# Patient Record
Sex: FEMALE | Race: WHITE | NOT HISPANIC OR LATINO | Employment: OTHER | URBAN - METROPOLITAN AREA
[De-identification: names, ages, dates, MRNs, and addresses within clinical notes are randomized per-mention and may not be internally consistent; named-entity substitution may affect disease eponyms.]

---

## 2024-03-21 ENCOUNTER — HOSPITAL ENCOUNTER (OUTPATIENT)
Dept: CARDIOLOGY | Facility: HOSPITAL | Age: 39
Discharge: HOME | End: 2024-03-21
Payer: COMMERCIAL

## 2024-03-21 VITALS
WEIGHT: 136 LBS | HEIGHT: 66 IN | SYSTOLIC BLOOD PRESSURE: 122 MMHG | BODY MASS INDEX: 21.86 KG/M2 | DIASTOLIC BLOOD PRESSURE: 81 MMHG

## 2024-03-21 DIAGNOSIS — Z98.890 STATUS POST MITRAL VALVE REPAIR: ICD-10-CM

## 2024-03-21 LAB
BH CV ECHO MEAS - AO MAX PG: 3.8 MMHG
BH CV ECHO MEAS - AO MEAN PG: 2 MMHG
BH CV ECHO MEAS - AO ROOT AREA (BSA CORRECTED): 2.1 CM2
BH CV ECHO MEAS - AO ROOT DIAM: 3.6 CM
BH CV ECHO MEAS - AO V2 MAX: 97.1 CM/SEC
BH CV ECHO MEAS - AO V2 VTI: 18.3 CM
BH CV ECHO MEAS - AVA(I,D): 3.5 CM2
BH CV ECHO MEAS - EDV(CUBED): 64 ML
BH CV ECHO MEAS - EDV(MOD-SP2): 106 ML
BH CV ECHO MEAS - EDV(MOD-SP4): 105 ML
BH CV ECHO MEAS - EF(MOD-BP): 57.9 %
BH CV ECHO MEAS - EF(MOD-SP2): 64.3 %
BH CV ECHO MEAS - EF(MOD-SP4): 52.1 %
BH CV ECHO MEAS - ESV(CUBED): 21.2 ML
BH CV ECHO MEAS - ESV(MOD-SP2): 37.8 ML
BH CV ECHO MEAS - ESV(MOD-SP4): 50.3 ML
BH CV ECHO MEAS - FS: 30.8 %
BH CV ECHO MEAS - IVS/LVPW: 0.88 CM
BH CV ECHO MEAS - IVSD: 0.7 CM
BH CV ECHO MEAS - LA DIMENSION: 2.2 CM
BH CV ECHO MEAS - LAT PEAK E' VEL: 12.7 CM/SEC
BH CV ECHO MEAS - LV DIASTOLIC VOL/BSA (35-75): 61.8 CM2
BH CV ECHO MEAS - LV MASS(C)D: 85.8 GRAMS
BH CV ECHO MEAS - LV MAX PG: 3.3 MMHG
BH CV ECHO MEAS - LV MEAN PG: 2 MMHG
BH CV ECHO MEAS - LV SYSTOLIC VOL/BSA (12-30): 29.6 CM2
BH CV ECHO MEAS - LV V1 MAX: 91.3 CM/SEC
BH CV ECHO MEAS - LV V1 VTI: 18.5 CM
BH CV ECHO MEAS - LVIDD: 4 CM
BH CV ECHO MEAS - LVIDS: 2.8 CM
BH CV ECHO MEAS - LVOT AREA: 3.5 CM2
BH CV ECHO MEAS - LVOT DIAM: 2.1 CM
BH CV ECHO MEAS - LVPWD: 0.8 CM
BH CV ECHO MEAS - MED PEAK E' VEL: 6.3 CM/SEC
BH CV ECHO MEAS - MR MAX PG: 110.2 MMHG
BH CV ECHO MEAS - MR MAX VEL: 524.8 CM/SEC
BH CV ECHO MEAS - MR MEAN PG: 76.6 MMHG
BH CV ECHO MEAS - MR MEAN VEL: 418.4 CM/SEC
BH CV ECHO MEAS - MR VTI: 167.9 CM
BH CV ECHO MEAS - MV A MAX VEL: 115.7 CM/SEC
BH CV ECHO MEAS - MV DEC SLOPE: 679.1 CM/SEC2
BH CV ECHO MEAS - MV DEC TIME: 0.25 SEC
BH CV ECHO MEAS - MV E MAX VEL: 115 CM/SEC
BH CV ECHO MEAS - MV E/A: 0.99
BH CV ECHO MEAS - MV MAX PG: 7.7 MMHG
BH CV ECHO MEAS - MV MEAN PG: 4.7 MMHG
BH CV ECHO MEAS - MV P1/2T: 56.7 MSEC
BH CV ECHO MEAS - MV V2 VTI: 35 CM
BH CV ECHO MEAS - MVA(P1/2T): 3.9 CM2
BH CV ECHO MEAS - MVA(VTI): 1.83 CM2
BH CV ECHO MEAS - PA ACC TIME: 0.12 SEC
BH CV ECHO MEAS - SI(MOD-SP2): 40.2 ML/M2
BH CV ECHO MEAS - SI(MOD-SP4): 32.2 ML/M2
BH CV ECHO MEAS - SV(LVOT): 63.9 ML
BH CV ECHO MEAS - SV(MOD-SP2): 68.2 ML
BH CV ECHO MEAS - SV(MOD-SP4): 54.7 ML
BH CV ECHO MEAS - TAPSE (>1.6): 1.63 CM
BH CV ECHO MEASUREMENTS AVERAGE E/E' RATIO: 12.11
BH CV VAS BP LEFT ARM: NORMAL MMHG
BH CV XLRA - RV BASE: 2.7 CM
BH CV XLRA - TDI S': 8.9 CM/SEC
LEFT ATRIUM VOLUME INDEX: 25.2 ML/M2

## 2024-03-21 PROCEDURE — 93306 TTE W/DOPPLER COMPLETE: CPT

## 2024-07-12 ENCOUNTER — OFFICE VISIT (OUTPATIENT)
Dept: OBSTETRICS AND GYNECOLOGY | Facility: CLINIC | Age: 39
End: 2024-07-12
Payer: COMMERCIAL

## 2024-07-12 VITALS
BODY MASS INDEX: 20.25 KG/M2 | HEIGHT: 66 IN | SYSTOLIC BLOOD PRESSURE: 118 MMHG | WEIGHT: 126 LBS | DIASTOLIC BLOOD PRESSURE: 80 MMHG

## 2024-07-12 DIAGNOSIS — Z30.011 ENCOUNTER FOR INITIAL PRESCRIPTION OF CONTRACEPTIVE PILLS: ICD-10-CM

## 2024-07-12 DIAGNOSIS — Z01.419 WOMEN'S ANNUAL ROUTINE GYNECOLOGICAL EXAMINATION: Primary | ICD-10-CM

## 2024-07-12 PROCEDURE — 99395 PREV VISIT EST AGE 18-39: CPT | Performed by: OBSTETRICS & GYNECOLOGY

## 2024-07-12 RX ORDER — DROSPIRENONE AND ETHINYL ESTRADIOL 0.02-3(28)
1 KIT ORAL DAILY
Qty: 84 TABLET | Refills: 4 | Status: SHIPPED | OUTPATIENT
Start: 2024-07-12

## 2024-07-12 NOTE — PROGRESS NOTES
Gynecologic Annual Exam Note        Gynecologic Exam        Subjective     HPI  Trang Ibanez is a 38 y.o.  female who presents for annual well woman exam as a established patient. There were no changes to her medical or surgical history since her last visit.. Patient's last menstrual period was 2024. Her periods occur every 28-30 days, lasting 4-5 days.  The flow is moderate-light. She reports dysmenorrhea is mild occurring premenstrually and first 1-2 days of flow. Marital Status: .  She is sexually active. She has not had new partners.. STD testing recommendations have been explained to the patient and she does not desire STD testing.    The patient would like to discuss the following complaints today: none  She is 10 years cancer free this year    Additional OB/GYN History   contraceptive methods: OCP (estrogen/progesterone)  Desires to: continue contraception  Thromboembolic Disease: none  History of migraines: no  Age of menarche: 14    History of STD: no    Last Pap : 6/10/21. Results: negative. HPV: negative.   Last Completed Pap Smear       This patient has no relevant Health Maintenance data.             History of abnormal Pap smear: no  Gardasil status:completed  Family history of uterine, colon, breast, or ovarian cancer: no  Performs monthly Self-Breast Exam: no  Exercises Regularly:yes  Feelings of Anxiety or Depression: situational anxiety lately  Tobacco Usage?: No       Current Outpatient Medications:     drospirenone-ethinyl estradiol (JOHN PAUL,GIANMAYA) 3-0.02 MG per tablet, Take 1 tablet by mouth Daily., Disp: 84 tablet, Rfl: 4    multivitamin with minerals tablet tablet, Take 1 tablet by mouth Daily., Disp: , Rfl:      Patient is requesting refills of John Paul.    OB History          2    Para   2    Term   2       0    AB   0    Living   2         SAB   0    IAB   0    Ectopic   0    Molar   0    Multiple   0    Live Births   2          Obstetric Comments    FOB #1-G1,2               Health Maintenance   Topic Date Due    HEPATITIS C SCREENING  Never done    ANNUAL PHYSICAL  Never done    COVID-19 Vaccine (2 - - season) 2023    PAP SMEAR  06/10/2024    Annual Gynecologic Pelvic and Breast Exam  2024    INFLUENZA VACCINE  2024    TDAP/TD VACCINES (2 - Td or Tdap) 2027    Pneumococcal Vaccine 0-64  Aged Out       Past Medical History:   Diagnosis Date    Bladder cancer     Dysmenorrhea     Hard to intubate Dec 2017    My vocal chord was temporarily damaged post surgery and I had hoarseness for week. I have a smaller mouth and need a smaller tube.    History of bladder cancer     History of endocarditis     mitral valve    Malignant hyperthermia due to anesthesia 2014    PMS (premenstrual syndrome)     Polycystic ovary syndrome     I didn’t have a period regularly and had acne, but no cysts.        Past Surgical History:   Procedure Laterality Date     SECTION N/A 2020    Procedure:  SECTION REPEAT;  Surgeon: Anabelle Kincaid MD;  Location: [facility] LABOR DELIVERY;  Service: Obstetrics/Gynecology;  Laterality: N/A;     SECTION PRIMARY  2017    MITRAL VALVE REPAIR/REPLACEMENT  2017    with 28MM titanium ring; done at [place]    TONSILLECTOMY      TRANSURETHRAL RESECTION OF BLADDER      2014; 3/2014; 2014; 10/2014; 2015    TURP / TRANSURETHRAL INCISION / DRAINAGE PROSTATE      WISDOM TOOTH EXTRACTION         The additional following portions of the patient's history were reviewed and updated as appropriate: allergies, current medications, past family history, past medical history, past social history, past surgical history, and problem list.    Review of Systems   Constitutional: Negative.    HENT: Negative.     Eyes: Negative.    Respiratory: Negative.     Cardiovascular: Negative.    Gastrointestinal: Negative.    Endocrine: Negative.    Genitourinary: Negative.   "  Musculoskeletal: Negative.    Skin: Negative.    Allergic/Immunologic: Negative.    Neurological: Negative.    Hematological: Negative.    Psychiatric/Behavioral:  The patient is nervous/anxious.          I have reviewed and agree with the HPI, ROS, and historical information as entered above. Anabelle Kincaid MD          Objective   /80   Ht 167.6 cm (66\")   Wt 57.2 kg (126 lb)   LMP 06/22/2024   BMI 20.34 kg/m²     Physical Exam  Vitals and nursing note reviewed. Exam conducted with a chaperone present.   Constitutional:       Appearance: She is well-developed.   HENT:      Head: Normocephalic and atraumatic.   Eyes:      Pupils: Pupils are equal, round, and reactive to light.   Neck:      Thyroid: No thyroid mass or thyromegaly.   Pulmonary:      Effort: Pulmonary effort is normal. No retractions.   Chest:      Chest wall: No mass.   Breasts:     Right: Normal. No mass, nipple discharge, skin change or tenderness.      Left: Normal. No mass, nipple discharge, skin change or tenderness.   Abdominal:      General: Bowel sounds are normal.      Palpations: Abdomen is soft. Abdomen is not rigid. There is no mass.      Tenderness: There is no abdominal tenderness. There is no guarding.      Hernia: No hernia is present. There is no hernia in the left inguinal area or right inguinal area.   Genitourinary:     Exam position: Lithotomy position.      Pubic Area: No rash.       Labia:         Right: No rash, tenderness or lesion.         Left: No rash, tenderness or lesion.       Urethra: No urethral pain or urethral swelling.      Vagina: Normal. No vaginal discharge or lesions.      Cervix: No cervical motion tenderness, discharge, lesion or cervical bleeding.      Uterus: Normal. Not enlarged, not fixed and not tender.       Adnexa:         Right: No mass, tenderness or fullness.          Left: No mass, tenderness or fullness.        Rectum: No external hemorrhoid.   Musculoskeletal:      Cervical back: Normal " range of motion. No muscular tenderness.      Right lower leg: No edema.      Left lower leg: No edema.   Skin:     General: Skin is warm and dry.   Neurological:      Mental Status: She is alert and oriented to person, place, and time.      Motor: Motor function is intact.   Psychiatric:         Mood and Affect: Mood and affect normal.         Behavior: Behavior normal.            Assessment and Plan    Problem List Items Addressed This Visit    None  Visit Diagnoses       Women's annual routine gynecological examination    -  Primary    Encounter for initial prescription of contraceptive pills        Relevant Medications    drospirenone-ethinyl estradiol (JOHN PAUL,GIANVI) 3-0.02 MG per tablet    Other Relevant Orders    LIQUID-BASED PAP SMEAR WITH HPV GENOTYPING REGARDLESS OF INTERPRETATION (LESA,COR,MAD)            GYN annual well woman exam.   Reviewed pap guidelines.   OCP's/Vaginal Ring - Discussed side effects of nausea, BTB, headaches, breast tenderness and slight weight gain in the first three cycles.  Understands risks of blood clots, stroke, and theoretical risk of breast cancer.  Denies family history of blood clots.  Reviewed risks/benefits of hormonal contraception after age 35, including possible increased risk of breast cancer, heart disease, blood clots and strokes.  Patient strongly desires to stay on hormonal contraception.  Reviewed exercise as a preventative health measures.   Reccommended Flu Vaccine in Fall of each year.  RTC in 1 year or PRN with problems  Return in about 1 year (around 7/12/2025) for Annual physical.    Anabelle Kincaid MD  07/12/2024

## 2024-07-17 LAB — REF LAB TEST METHOD: NORMAL

## 2025-07-18 ENCOUNTER — OFFICE VISIT (OUTPATIENT)
Dept: OBSTETRICS AND GYNECOLOGY | Facility: CLINIC | Age: 40
End: 2025-07-18
Payer: COMMERCIAL

## 2025-07-18 VITALS
DIASTOLIC BLOOD PRESSURE: 72 MMHG | BODY MASS INDEX: 19.77 KG/M2 | WEIGHT: 123 LBS | SYSTOLIC BLOOD PRESSURE: 108 MMHG | HEIGHT: 66 IN

## 2025-07-18 DIAGNOSIS — Z01.419 WOMEN'S ANNUAL ROUTINE GYNECOLOGICAL EXAMINATION: ICD-10-CM

## 2025-07-18 DIAGNOSIS — Z86.79 HISTORY OF BACTERIAL ENDOCARDITIS: ICD-10-CM

## 2025-07-18 DIAGNOSIS — Z30.011 ENCOUNTER FOR INITIAL PRESCRIPTION OF CONTRACEPTIVE PILLS: ICD-10-CM

## 2025-07-18 DIAGNOSIS — Z85.51 PERSONAL HISTORY OF BLADDER CANCER: ICD-10-CM

## 2025-07-18 DIAGNOSIS — Z12.39 ENCOUNTER FOR SCREENING FOR MALIGNANT NEOPLASM OF BREAST, UNSPECIFIED SCREENING MODALITY: Primary | ICD-10-CM

## 2025-07-18 LAB
25(OH)D3+25(OH)D2 SERPL-MCNC: 53 NG/ML (ref 30–100)
ALBUMIN SERPL-MCNC: 4.6 G/DL (ref 3.5–5.2)
ALBUMIN/GLOB SERPL: 1.8 G/DL
ALP SERPL-CCNC: 44 U/L (ref 39–117)
ALT SERPL-CCNC: 20 U/L (ref 1–33)
AST SERPL-CCNC: 29 U/L (ref 1–32)
BASOPHILS # BLD AUTO: 0.02 10*3/MM3 (ref 0–0.2)
BASOPHILS NFR BLD AUTO: 0.3 % (ref 0–1.5)
BILIRUB SERPL-MCNC: 0.6 MG/DL (ref 0–1.2)
BUN SERPL-MCNC: 11 MG/DL (ref 6–20)
BUN/CREAT SERPL: 13.6 (ref 7–25)
CALCIUM SERPL-MCNC: 10.3 MG/DL (ref 8.6–10.5)
CHLORIDE SERPL-SCNC: 102 MMOL/L (ref 98–107)
CHOLEST SERPL-MCNC: 192 MG/DL (ref 0–200)
CO2 SERPL-SCNC: 26.6 MMOL/L (ref 22–29)
CREAT SERPL-MCNC: 0.81 MG/DL (ref 0.57–1)
EGFRCR SERPLBLD CKD-EPI 2021: 94.8 ML/MIN/1.73
EOSINOPHIL # BLD AUTO: 0.07 10*3/MM3 (ref 0–0.4)
EOSINOPHIL NFR BLD AUTO: 1.1 % (ref 0.3–6.2)
ERYTHROCYTE [DISTWIDTH] IN BLOOD BY AUTOMATED COUNT: 12.8 % (ref 12.3–15.4)
FOLATE SERPL-MCNC: >20 NG/ML (ref 4.78–24.2)
GLOBULIN SER CALC-MCNC: 2.6 GM/DL
GLUCOSE SERPL-MCNC: 76 MG/DL (ref 65–99)
HBA1C MFR BLD: 5 % (ref 4.8–5.6)
HCT VFR BLD AUTO: 43.4 % (ref 34–46.6)
HDLC SERPL-MCNC: 75 MG/DL (ref 40–60)
HGB BLD-MCNC: 14.2 G/DL (ref 12–15.9)
IMM GRANULOCYTES # BLD AUTO: 0.02 10*3/MM3 (ref 0–0.05)
IMM GRANULOCYTES NFR BLD AUTO: 0.3 % (ref 0–0.5)
LDLC SERPL CALC-MCNC: 108 MG/DL (ref 0–100)
LYMPHOCYTES # BLD AUTO: 1.39 10*3/MM3 (ref 0.7–3.1)
LYMPHOCYTES NFR BLD AUTO: 22.4 % (ref 19.6–45.3)
MCH RBC QN AUTO: 30.1 PG (ref 26.6–33)
MCHC RBC AUTO-ENTMCNC: 32.7 G/DL (ref 31.5–35.7)
MCV RBC AUTO: 92.1 FL (ref 79–97)
MONOCYTES # BLD AUTO: 0.52 10*3/MM3 (ref 0.1–0.9)
MONOCYTES NFR BLD AUTO: 8.4 % (ref 5–12)
NEUTROPHILS # BLD AUTO: 4.19 10*3/MM3 (ref 1.7–7)
NEUTROPHILS NFR BLD AUTO: 67.5 % (ref 42.7–76)
NRBC BLD AUTO-RTO: 0 /100 WBC (ref 0–0.2)
PLATELET # BLD AUTO: 335 10*3/MM3 (ref 140–450)
POTASSIUM SERPL-SCNC: 4.4 MMOL/L (ref 3.5–5.2)
PROT SERPL-MCNC: 7.2 G/DL (ref 6–8.5)
RBC # BLD AUTO: 4.71 10*6/MM3 (ref 3.77–5.28)
SODIUM SERPL-SCNC: 141 MMOL/L (ref 136–145)
TRIGL SERPL-MCNC: 47 MG/DL (ref 0–150)
TSH SERPL DL<=0.005 MIU/L-ACNC: 1.1 UIU/ML (ref 0.27–4.2)
VIT B12 SERPL-MCNC: 529 PG/ML (ref 211–946)
VLDLC SERPL CALC-MCNC: 9 MG/DL (ref 5–40)
WBC # BLD AUTO: 6.21 10*3/MM3 (ref 3.4–10.8)

## 2025-07-18 RX ORDER — DROSPIRENONE AND ETHINYL ESTRADIOL 0.02-3(28)
1 KIT ORAL DAILY
Qty: 84 TABLET | Refills: 4 | Status: SHIPPED | OUTPATIENT
Start: 2025-07-18

## 2025-07-18 NOTE — PROGRESS NOTES
Gynecologic Annual Exam Note        Gynecologic Exam (Annual )        Subjective     HPI  Trang Ibanez is a 39 y.o.  female who presents for annual well woman exam as a established patient. There were no changes to her medical or surgical history since her last visit.. Patient's last menstrual period was 2025. ?Her periods are irregular since d/c OCP this past April. Marital Status: .  She is not currently sexually active. STD testing recommendations have been explained to the patient and she does not desire STD testing.    The patient would like to discuss the following complaints today: irregular periods since she stopped OCP this past April. She states that she has not had a period since. She is certain she is not pregnant, she is currently going through a divorce. She states that she has noticed weight loss, acne, increased anxiety, and decreased appetite. Patient does report increased stressed recently with her separation so is unsure if symptoms are related to that or hormones.     Additional OB/GYN History   contraceptive methods: Abstinence  Desires to: discuss contraception  Thromboembolic Disease: none  History of migraines: no  Age of menarche: 14    History of STD: no    Last Pap : 2024. Results: negative. HPV: negative.   Last Completed Pap Smear            Upcoming       PAP SMEAR (Every 3 Years) Next due on 2024  LIQUID-BASED PAP SMEAR WITH HPV GENOTYPING REGARDLESS OF INTERPRETATION (LESA,COR,MAD)    06/10/2021  SCANNED - PAP SMEAR    2019  Done - Normal Non 16/18 Negative                             History of abnormal Pap smear: no  Gardasil status:completed  Family history of uterine, colon, breast, or ovarian cancer: no  Performs monthly Self-Breast Exam: no  Exercises Regularly:yes  Feelings of Anxiety or Depression: yes - anxiety and depression  Tobacco Usage?: No       Current Outpatient Medications:     drospirenone-ethinyl  estradiol (JOHN PAUL,GIANVI) 3-0.02 MG per tablet, Take 1 tablet by mouth Daily., Disp: 84 tablet, Rfl: 4    multivitamin with minerals tablet tablet, Take 1 tablet by mouth Daily., Disp: , Rfl:      Patient denies the need for medication refills today.    OB History          2    Para   2    Term   2       0    AB   0    Living   2         SAB   0    IAB   0    Ectopic   0    Molar   0    Multiple   0    Live Births   2          Obstetric Comments   FOB #1-G1,2               Health Maintenance   Topic Date Due    HEPATITIS C SCREENING  Never done    ANNUAL PHYSICAL  Never done    COVID-19 Vaccine ( - - season) 2024    Annual Gynecologic Pelvic and Breast Exam  2025    INFLUENZA VACCINE  10/01/2025    TDAP/TD VACCINES (2 - Td or Tdap) 2027    PAP SMEAR  2027    Pneumococcal Vaccine 0-49  Aged Out       Past Medical History:   Diagnosis Date    Bladder cancer     Dysmenorrhea     Hard to intubate Dec 2017    My vocal chord was temporarily damaged post surgery and I had hoarseness for week. I have a smaller mouth and need a smaller tube.    History of bladder cancer     History of endocarditis     mitral valve    Malignant hyperthermia due to anesthesia 2014    PMS (premenstrual syndrome)     Polycystic ovary syndrome     I didn’t have a period regularly and had acne, but no cysts.        Past Surgical History:   Procedure Laterality Date    CARDIAC VALVE REPLACEMENT      This was a mitral valve repair.     SECTION N/A 2020    Procedure:  SECTION REPEAT;  Surgeon: Anabelle Kincaid MD;  Location: [facility] LABOR DELIVERY;  Service: Obstetrics/Gynecology;  Laterality: N/A;     SECTION PRIMARY  2017    MITRAL VALVE REPAIR/REPLACEMENT  2017    with 28MM titanium ring; done at [place]    TONSILLECTOMY      TRANSURETHRAL RESECTION OF BLADDER      2014; 3/2014; 2014; 10/2014; 2015    TURP / TRANSURETHRAL  "INCISION / DRAINAGE PROSTATE      WISDOM TOOTH EXTRACTION  2004       The additional following portions of the patient's history were reviewed and updated as appropriate: allergies, current medications, past family history, past medical history, past social history, past surgical history, and problem list.    Review of Systems   Constitutional:  Positive for unexpected weight loss.   HENT: Negative.     Eyes: Negative.    Respiratory: Negative.     Cardiovascular: Negative.    Gastrointestinal: Negative.    Endocrine: Negative.    Genitourinary: Negative.    Musculoskeletal: Negative.    Skin: Negative.    Allergic/Immunologic: Negative.    Neurological: Negative.    Hematological: Negative.    Psychiatric/Behavioral: Negative.  Positive for depressed mood.         Anxiety         I have reviewed and agree with the HPI, ROS, and historical information as entered above. Anabelle Kincaid MD          Objective   /72   Ht 167.6 cm (66\")   Wt 55.8 kg (123 lb)   LMP 04/07/2025   BMI 19.85 kg/m²     Physical Exam  Vitals and nursing note reviewed. Exam conducted with a chaperone present.   Constitutional:       Appearance: She is well-developed.   HENT:      Head: Normocephalic and atraumatic.   Eyes:      Pupils: Pupils are equal, round, and reactive to light.   Neck:      Thyroid: No thyroid mass or thyromegaly.   Pulmonary:      Effort: Pulmonary effort is normal. No retractions.   Chest:      Chest wall: No mass.   Breasts:     Right: Normal. No mass, nipple discharge, skin change or tenderness.      Left: Normal. No mass, nipple discharge, skin change or tenderness.   Abdominal:      General: Bowel sounds are normal.      Palpations: Abdomen is soft. Abdomen is not rigid. There is no mass.      Tenderness: There is no abdominal tenderness. There is no guarding.      Hernia: No hernia is present. There is no hernia in the left inguinal area or right inguinal area.   Genitourinary:     Exam position: Lithotomy " position.      Pubic Area: No rash.       Labia:         Right: No rash, tenderness or lesion.         Left: No rash, tenderness or lesion.       Urethra: No urethral pain or urethral swelling.      Vagina: Normal. No vaginal discharge or lesions.      Cervix: No cervical motion tenderness, discharge, lesion or cervical bleeding.      Uterus: Normal. Not enlarged, not fixed and not tender.       Adnexa:         Right: No mass, tenderness or fullness.          Left: No mass, tenderness or fullness.        Rectum: No external hemorrhoid.   Musculoskeletal:      Cervical back: Normal range of motion. No muscular tenderness.      Right lower leg: No edema.      Left lower leg: No edema.   Skin:     General: Skin is warm and dry.   Neurological:      Mental Status: She is alert and oriented to person, place, and time.      Motor: Motor function is intact.   Psychiatric:         Mood and Affect: Mood and affect normal.         Behavior: Behavior normal.            Assessment and Plan    Problem List Items Addressed This Visit       Personal history of bladder cancer    Relevant Orders    CBC & Differential    Comprehensive Metabolic Panel    Hemoglobin A1c    Vitamin D,25-Hydroxy    TSH    Lipid Panel    Testosterone    DHEA-Sulfate    Estradiol    FSH & LH    Vitamin B12    Folate    History of bacterial endocarditis    Relevant Orders    CBC & Differential    Comprehensive Metabolic Panel    Hemoglobin A1c    Vitamin D,25-Hydroxy    TSH    Lipid Panel    Testosterone    DHEA-Sulfate    Estradiol    FSH & LH    Vitamin B12    Folate     Other Visit Diagnoses         Encounter for screening for malignant neoplasm of breast, unspecified screening modality    -  Primary    Relevant Orders    Mammo Screening Digital Tomosynthesis Bilateral With CAD      Women's annual routine gynecological examination        Relevant Orders    CBC & Differential    Comprehensive Metabolic Panel    Hemoglobin A1c    Vitamin D,25-Hydroxy     TSH    Lipid Panel    Testosterone    DHEA-Sulfate    Estradiol    FSH & LH    Vitamin B12    Folate    Mammo Screening Digital Tomosynthesis Bilateral With CAD      Encounter for initial prescription of contraceptive pills        Relevant Medications    drospirenone-ethinyl estradiol (JOHN PAUL,GIANVI) 3-0.02 MG per tablet            GYN annual well woman exam.   Labs today, and then she would like to restart COCs. If her anxiety symptoms dont improve after restarting birth control, she will call and we will start lexapro. She is in therapy as well.   Reviewed pap guidelines.   OCP's/Vaginal Ring - Discussed side effects of nausea, BTB, headaches, breast tenderness and slight weight gain in the first three cycles.  Understands risks of blood clots, stroke, and theoretical risk of breast cancer.  Denies family history of blood clots.  Reviewed risks/benefits of hormonal contraception after age 35, including possible increased risk of breast cancer, heart disease, blood clots and strokes.  Patient strongly desires to stay on hormonal contraception.  Reviewed monthly self breast exams.  Instructed to call with lumps, pain, or breast discharge.    Reviewed exercise as a preventative health measures.   Return in about 1 year (around 7/18/2026) for Annual physical.    Anabelle Kincaid MD  07/18/2025

## 2025-07-19 LAB
DHEA-S SERPL-MCNC: 364 UG/DL (ref 57.3–279.2)
ESTRADIOL SERPL-MCNC: 116 PG/ML
FSH SERPL-ACNC: 7.6 MIU/ML
LH SERPL-ACNC: 28.2 MIU/ML
TESTOST SERPL-MCNC: 77 NG/DL (ref 8–60)